# Patient Record
Sex: MALE | Race: WHITE | NOT HISPANIC OR LATINO | Employment: FULL TIME | URBAN - METROPOLITAN AREA
[De-identification: names, ages, dates, MRNs, and addresses within clinical notes are randomized per-mention and may not be internally consistent; named-entity substitution may affect disease eponyms.]

---

## 2022-01-07 ENCOUNTER — LAB REQUISITION (OUTPATIENT)
Dept: LAB | Age: 57
End: 2022-01-07

## 2022-01-07 DIAGNOSIS — Z00.00 ENCOUNTER FOR GENERAL ADULT MEDICAL EXAMINATION WITHOUT ABNORMAL FINDINGS: ICD-10-CM

## 2022-01-07 PROCEDURE — PSEU8229 VITAMIN D -25 HYDROXY: Performed by: CLINICAL MEDICAL LABORATORY

## 2022-01-07 PROCEDURE — 82306 VITAMIN D 25 HYDROXY: CPT | Performed by: CLINICAL MEDICAL LABORATORY

## 2022-01-08 LAB — 25(OH)D3+25(OH)D2 SERPL-MCNC: 15.3 NG/ML (ref 30–100)

## 2022-06-23 ENCOUNTER — LAB REQUISITION (OUTPATIENT)
Dept: LAB | Age: 57
End: 2022-06-23

## 2022-06-23 DIAGNOSIS — E55.9 VITAMIN D DEFICIENCY, UNSPECIFIED: ICD-10-CM

## 2022-06-23 LAB — 25(OH)D3+25(OH)D2 SERPL-MCNC: 36.4 NG/ML (ref 30–100)

## 2022-06-23 PROCEDURE — 82306 VITAMIN D 25 HYDROXY: CPT | Performed by: CLINICAL MEDICAL LABORATORY

## 2022-06-23 PROCEDURE — PSEU8229 VITAMIN D -25 HYDROXY: Performed by: CLINICAL MEDICAL LABORATORY

## 2023-06-13 ENCOUNTER — TELEPHONE (OUTPATIENT)
Dept: NEUROLOGY | Age: 58
End: 2023-06-13

## 2023-08-15 PROCEDURE — 86003 ALLG SPEC IGE CRUDE XTRC EA: CPT | Performed by: CLINICAL MEDICAL LABORATORY

## 2023-08-15 PROCEDURE — PSEU9175 ALLERGEN: ENGLISH PLANTAIN, IGE: Performed by: CLINICAL MEDICAL LABORATORY

## 2023-08-15 PROCEDURE — PSEU9103 ALLERGEN: ALTERNARIA TENUIS, IGE: Performed by: CLINICAL MEDICAL LABORATORY

## 2023-08-15 PROCEDURE — PSEU9154 ALLERGEN: COCKROACH, IGE: Performed by: CLINICAL MEDICAL LABORATORY

## 2023-08-15 PROCEDURE — PSEU9285 ALLERGEN: RUSSIAN THISTLE, IGE: Performed by: CLINICAL MEDICAL LABORATORY

## 2023-08-15 PROCEDURE — PSEU9167 ALLERGEN: DERMATOPHAGOIDES FARINAE, IGE: Performed by: CLINICAL MEDICAL LABORATORY

## 2023-08-15 PROCEDURE — PSEU9265 ALLERGEN: PIGWEED, IGE: Performed by: CLINICAL MEDICAL LABORATORY

## 2023-08-15 PROCEDURE — PSEU9106 ALLERGEN: ASPERGILLUS FUMIGATUS, IGE: Performed by: CLINICAL MEDICAL LABORATORY

## 2023-08-15 PROCEDURE — 82785 ASSAY OF IGE: CPT | Performed by: CLINICAL MEDICAL LABORATORY

## 2023-08-15 PROCEDURE — PSEU9188 ALLERGEN: GIANT RAGWEED, IGE: Performed by: CLINICAL MEDICAL LABORATORY

## 2023-08-15 PROCEDURE — PSEU9169 ALLERGEN: DOG DANDER, IGE: Performed by: CLINICAL MEDICAL LABORATORY

## 2023-08-15 PROCEDURE — PSEU9259 ALLERGEN: PENICILLIUM CHRYSOGENUM, IGE: Performed by: CLINICAL MEDICAL LABORATORY

## 2023-08-15 PROCEDURE — PSEU9210 ALLERGEN: JUNE GRASS, IGE: Performed by: CLINICAL MEDICAL LABORATORY

## 2023-08-15 PROCEDURE — PSEU9160 ALLERGEN: COTTONWOOD TREE, IGE: Performed by: CLINICAL MEDICAL LABORATORY

## 2023-08-15 PROCEDURE — PSEU9213 ALLERGEN: LAMBS QUARTERS, IGE: Performed by: CLINICAL MEDICAL LABORATORY

## 2023-08-15 PROCEDURE — PSEU9136 ALLERGEN: CAT DANDER, IGE: Performed by: CLINICAL MEDICAL LABORATORY

## 2023-08-15 PROCEDURE — PSEU9125 ALLERGEN: BOX ELDER TREE, IGE: Performed by: CLINICAL MEDICAL LABORATORY

## 2023-08-15 PROCEDURE — PSEU9264 ALLERGEN: PHOMA BETAE: Performed by: CLINICAL MEDICAL LABORATORY

## 2023-08-15 PROCEDURE — PSEU9168 ALLERGEN: DERMATOPHAGOIDES PTERONYSSINUS, IGE: Performed by: CLINICAL MEDICAL LABORATORY

## 2023-08-15 PROCEDURE — PSEU9240 ALLERGEN: OAK TREE, IGE: Performed by: CLINICAL MEDICAL LABORATORY

## 2023-08-15 PROCEDURE — PSEU9202 ALLERGEN: HORMODENDRUM, IGE: Performed by: CLINICAL MEDICAL LABORATORY

## 2023-08-15 PROCEDURE — PSEU9321 ALLERGEN: WHITE ASH TREE, IGE: Performed by: CLINICAL MEDICAL LABORATORY

## 2023-08-15 PROCEDURE — PSEU9203 ALLERGEN: HORSE DANDER, IGE: Performed by: CLINICAL MEDICAL LABORATORY

## 2023-08-15 PROCEDURE — PSEU9231 ALLERGEN: MOUSE EPITHELIUM, IGE: Performed by: CLINICAL MEDICAL LABORATORY

## 2023-08-15 PROCEDURE — PSEU8536 IMMUNOGLOBULIN E: Performed by: CLINICAL MEDICAL LABORATORY

## 2023-08-15 PROCEDURE — PSEU9120 ALLERGEN: BIRCH TREE, IGE: Performed by: CLINICAL MEDICAL LABORATORY

## 2023-08-15 PROCEDURE — PSEU9174 ALLERGEN: ELM TREE, IGE: Performed by: CLINICAL MEDICAL LABORATORY

## 2023-08-16 ENCOUNTER — LAB REQUISITION (OUTPATIENT)
Dept: LAB | Age: 58
End: 2023-08-16

## 2023-08-16 DIAGNOSIS — H10.13 ACUTE ATOPIC CONJUNCTIVITIS, BILATERAL: ICD-10-CM

## 2023-08-16 DIAGNOSIS — J30.2 OTHER SEASONAL ALLERGIC RHINITIS: ICD-10-CM

## 2023-08-16 LAB — IGE SERPL-ACNC: 161.5 IUNITS/ML

## 2023-08-19 LAB
A ALTERNATA IGE QN: <0.35 KU/L
A FUMIGATUS IGE QN: <0.35 KU/L
AMER ROACH IGE QN: <0.35 KU/L
BOXELDER IGE QN: <0.35 KU/L
C HERBARUM IGE QN: <0.35 KU/L
CAT DANDER IGE QN: 1.07 KU/L
COTTONWOOD IGE QN: <0.35 KU/L
D FARINAE IGE QN: <0.35 KU/L
D PTERONYSS IGE QN: <0.35 KU/L
DEPRECATED A ALTERNATA IGE RAST QL: NORMAL
DEPRECATED A FUMIGATUS IGE RAST QL: NORMAL
DEPRECATED BOXELDER IGE RAST QL: NORMAL
DEPRECATED C HERBARUM IGE RAST QL: NORMAL
DEPRECATED CAT DANDER IGE RAST QL: ABNORMAL
DEPRECATED COTTONWOOD IGE RAST QL: NORMAL
DEPRECATED D FARINAE IGE RAST QL: NORMAL
DEPRECATED D PTERONYSS IGE RAST QL: NORMAL
DEPRECATED DOG DANDER IGE RAST QL: NORMAL
DEPRECATED ENGL PLANTAIN IGE RAST QL: NORMAL
DEPRECATED GIANT RAGWEED IGE RAST QL: NORMAL
DEPRECATED GOOSEFOOT IGE RAST QL: NORMAL
DEPRECATED HORSE DANDER IGE RAST QL: NORMAL
DEPRECATED KENT BLUE GRASS IGE RAST QL: ABNORMAL
DEPRECATED MISC ALLERGEN IGE RAST QL: NORMAL
DEPRECATED MOUSE EPITH IGE RAST QL: ABNORMAL
DEPRECATED P NOTATUM IGE RAST QL: NORMAL
DEPRECATED ROACH IGE RAST QL: NORMAL
DEPRECATED SILVER BIRCH IGE RAST QL: ABNORMAL
DEPRECATED WHITE ELM IGE RAST QL: NORMAL
DEPRECATED WHITE OAK IGE RAST QL: ABNORMAL
DOG DANDER IGE QN: <0.35 KU/L
ENGL PLANTAIN IGE QN: <0.35 KU/L
GIANT RAGWEED IGE QN: <0.35 KU/L
GOOSEFOOT IGE QN: <0.35 KU/L
HORSE DANDER IGE QN: <0.35 KU/L
KENT BLUE GRASS IGE QN: 1.22 KU/L
MOUSE EPITH IGE QN: 0.55 KU/L
P BETAE IGE QN: 0.19 KU/L
P NOTATUM IGE QN: <0.35 KU/L
SILVER BIRCH IGE QN: 27.9 KU/L
WHITE ELM IGE QN: <0.35 KU/L
WHITE OAK IGE QN: 7.94 KU/L

## 2023-08-21 LAB
CMN PIGWEED IGE QN: <0.35 KU/L
DEPRECATED COMMON PIGWEED IGE RAST QL: NORMAL

## 2023-08-22 LAB
DEPRECATED SALTWORT IGE RAST QL: NORMAL
DEPRECATED WHITE ASH IGE RAST QL: NORMAL
SALTWORT IGE QN: <0.35 KU/L
WHITE ASH IGE QN: <0.35 KU/L

## 2024-01-26 ENCOUNTER — HOSPITAL ENCOUNTER (INPATIENT)
Facility: HOSPITAL | Age: 59
LOS: 1 days | Discharge: HOME | End: 2024-01-26
Attending: EMERGENCY MEDICINE | Admitting: INTERNAL MEDICINE
Payer: COMMERCIAL

## 2024-01-26 ENCOUNTER — APPOINTMENT (OUTPATIENT)
Dept: CV DIAGNOSTICS | Facility: HOSPITAL | Age: 59
End: 2024-01-26
Attending: PHYSICIAN ASSISTANT
Payer: COMMERCIAL

## 2024-01-26 ENCOUNTER — HOSPITAL ENCOUNTER (OUTPATIENT)
Facility: HOSPITAL | Age: 59
Setting detail: OBSERVATION
Discharge: HOME | End: 2024-01-26
Attending: EMERGENCY MEDICINE | Admitting: INTERNAL MEDICINE
Payer: COMMERCIAL

## 2024-01-26 ENCOUNTER — APPOINTMENT (OUTPATIENT)
Dept: GENERAL RADIOLOGY | Facility: HOSPITAL | Age: 59
End: 2024-01-26
Attending: EMERGENCY MEDICINE
Payer: COMMERCIAL

## 2024-01-26 VITALS
TEMPERATURE: 98 F | WEIGHT: 239 LBS | RESPIRATION RATE: 13 BRPM | HEIGHT: 76 IN | OXYGEN SATURATION: 100 % | SYSTOLIC BLOOD PRESSURE: 123 MMHG | HEART RATE: 73 BPM | BODY MASS INDEX: 29.1 KG/M2 | DIASTOLIC BLOOD PRESSURE: 86 MMHG

## 2024-01-26 DIAGNOSIS — R42 LIGHTHEADED: ICD-10-CM

## 2024-01-26 DIAGNOSIS — R00.1 BRADYCARDIA: Primary | ICD-10-CM

## 2024-01-26 DIAGNOSIS — I95.9 HYPOTENSION, UNSPECIFIED HYPOTENSION TYPE: ICD-10-CM

## 2024-01-26 LAB
ALBUMIN SERPL-MCNC: 3.5 G/DL (ref 3.4–5)
ALBUMIN/GLOB SERPL: 1.1 {RATIO} (ref 1–2)
ALP LIVER SERPL-CCNC: 58 U/L
ANION GAP SERPL CALC-SCNC: 3 MMOL/L (ref 0–18)
AST SERPL-CCNC: 20 U/L (ref 15–37)
ATRIAL RATE: 55 BPM
BASOPHILS # BLD AUTO: 0.01 X10(3) UL (ref 0–0.2)
BASOPHILS NFR BLD AUTO: 0.2 %
BILIRUB SERPL-MCNC: 0.8 MG/DL (ref 0.1–2)
BUN BLD-MCNC: 15 MG/DL (ref 9–23)
CALCIUM BLD-MCNC: 8.8 MG/DL (ref 8.5–10.1)
CHLORIDE SERPL-SCNC: 110 MMOL/L (ref 98–112)
CO2 SERPL-SCNC: 27 MMOL/L (ref 21–32)
CREAT BLD-MCNC: 1.23 MG/DL
EGFRCR SERPLBLD CKD-EPI 2021: 68 ML/MIN/1.73M2 (ref 60–?)
EOSINOPHIL # BLD AUTO: 0.05 X10(3) UL (ref 0–0.7)
EOSINOPHIL NFR BLD AUTO: 1.1 %
ERYTHROCYTE [DISTWIDTH] IN BLOOD BY AUTOMATED COUNT: 11.8 %
GLOBULIN PLAS-MCNC: 3.3 G/DL (ref 2.8–4.4)
GLUCOSE BLD-MCNC: 135 MG/DL (ref 70–99)
HCT VFR BLD AUTO: 36.7 %
HGB BLD-MCNC: 13.2 G/DL
IMM GRANULOCYTES # BLD AUTO: 0.01 X10(3) UL (ref 0–1)
IMM GRANULOCYTES NFR BLD: 0.2 %
LYMPHOCYTES # BLD AUTO: 1.77 X10(3) UL (ref 1–4)
LYMPHOCYTES NFR BLD AUTO: 38.5 %
MCH RBC QN AUTO: 32.3 PG (ref 26–34)
MCHC RBC AUTO-ENTMCNC: 36 G/DL (ref 31–37)
MCV RBC AUTO: 89.7 FL
MONOCYTES # BLD AUTO: 0.37 X10(3) UL (ref 0.1–1)
MONOCYTES NFR BLD AUTO: 8 %
NEUTROPHILS # BLD AUTO: 2.39 X10 (3) UL (ref 1.5–7.7)
NEUTROPHILS # BLD AUTO: 2.39 X10(3) UL (ref 1.5–7.7)
NEUTROPHILS NFR BLD AUTO: 52 %
OSMOLALITY SERPL CALC.SUM OF ELEC: 293 MOSM/KG (ref 275–295)
P AXIS: 31 DEGREES
P-R INTERVAL: 204 MS
PLATELET # BLD AUTO: 151 10(3)UL (ref 150–450)
POTASSIUM SERPL-SCNC: 4.1 MMOL/L (ref 3.5–5.1)
PROT SERPL-MCNC: 6.8 G/DL (ref 6.4–8.2)
Q-T INTERVAL: 466 MS
QRS DURATION: 72 MS
QTC CALCULATION (BEZET): 445 MS
R AXIS: -2 DEGREES
RBC # BLD AUTO: 4.09 X10(6)UL
SODIUM SERPL-SCNC: 140 MMOL/L (ref 136–145)
T AXIS: 7 DEGREES
TROPONIN I SERPL HS-MCNC: 4 NG/L
TSI SER-ACNC: 2.62 MIU/ML (ref 0.36–3.74)
VENTRICULAR RATE: 55 BPM
WBC # BLD AUTO: 4.6 X10(3) UL (ref 4–11)

## 2024-01-26 PROCEDURE — 93306 TTE W/DOPPLER COMPLETE: CPT | Performed by: PHYSICIAN ASSISTANT

## 2024-01-26 PROCEDURE — 71045 X-RAY EXAM CHEST 1 VIEW: CPT | Performed by: EMERGENCY MEDICINE

## 2024-01-26 PROCEDURE — 99236 HOSP IP/OBS SAME DATE HI 85: CPT | Performed by: INTERNAL MEDICINE

## 2024-01-26 RX ORDER — MELATONIN
3 NIGHTLY PRN
Status: DISCONTINUED | OUTPATIENT
Start: 2024-01-26 | End: 2024-01-26

## 2024-01-26 RX ORDER — ACETAMINOPHEN 500 MG
1000 TABLET ORAL EVERY 8 HOURS PRN
Status: DISCONTINUED | OUTPATIENT
Start: 2024-01-26 | End: 2024-01-26

## 2024-01-26 RX ORDER — OMEPRAZOLE 20 MG/1
20 CAPSULE, DELAYED RELEASE ORAL
COMMUNITY

## 2024-01-26 RX ORDER — PROCHLORPERAZINE EDISYLATE 5 MG/ML
5 INJECTION INTRAMUSCULAR; INTRAVENOUS EVERY 8 HOURS PRN
Status: DISCONTINUED | OUTPATIENT
Start: 2024-01-26 | End: 2024-01-26

## 2024-01-26 RX ORDER — ENEMA 19; 7 G/133ML; G/133ML
1 ENEMA RECTAL ONCE AS NEEDED
Status: DISCONTINUED | OUTPATIENT
Start: 2024-01-26 | End: 2024-01-26

## 2024-01-26 RX ORDER — POLYETHYLENE GLYCOL 3350 17 G/17G
17 POWDER, FOR SOLUTION ORAL DAILY PRN
Status: DISCONTINUED | OUTPATIENT
Start: 2024-01-26 | End: 2024-01-26

## 2024-01-26 RX ORDER — ENOXAPARIN SODIUM 100 MG/ML
40 INJECTION SUBCUTANEOUS NIGHTLY
Status: DISCONTINUED | OUTPATIENT
Start: 2024-01-26 | End: 2024-01-26

## 2024-01-26 RX ORDER — BISACODYL 10 MG
10 SUPPOSITORY, RECTAL RECTAL
Status: DISCONTINUED | OUTPATIENT
Start: 2024-01-26 | End: 2024-01-26

## 2024-01-26 RX ORDER — ATENOLOL 25 MG/1
50 TABLET ORAL DAILY
Status: ON HOLD | COMMUNITY
End: 2024-01-26

## 2024-01-26 RX ORDER — RANOLAZINE 500 MG/1
500 TABLET, EXTENDED RELEASE ORAL 2 TIMES DAILY
COMMUNITY
End: 2024-01-26

## 2024-01-26 RX ORDER — ATENOLOL 50 MG/1
50 TABLET ORAL DAILY
COMMUNITY
End: 2024-01-26

## 2024-01-26 RX ORDER — SENNOSIDES 8.6 MG
17.2 TABLET ORAL NIGHTLY PRN
Status: DISCONTINUED | OUTPATIENT
Start: 2024-01-26 | End: 2024-01-26

## 2024-01-26 RX ORDER — SODIUM CHLORIDE, SODIUM LACTATE, POTASSIUM CHLORIDE, CALCIUM CHLORIDE 600; 310; 30; 20 MG/100ML; MG/100ML; MG/100ML; MG/100ML
INJECTION, SOLUTION INTRAVENOUS CONTINUOUS
Status: DISCONTINUED | OUTPATIENT
Start: 2024-01-26 | End: 2024-01-26

## 2024-01-26 RX ORDER — RUFINAMIDE 40 MG/ML
1 SUSPENSION ORAL DAILY
COMMUNITY

## 2024-01-26 RX ORDER — BENZONATATE 100 MG/1
200 CAPSULE ORAL 3 TIMES DAILY PRN
Status: DISCONTINUED | OUTPATIENT
Start: 2024-01-26 | End: 2024-01-26

## 2024-01-26 RX ORDER — AMLODIPINE BESYLATE 10 MG/1
10 TABLET ORAL DAILY
COMMUNITY

## 2024-01-26 RX ORDER — ONDANSETRON 2 MG/ML
4 INJECTION INTRAMUSCULAR; INTRAVENOUS EVERY 6 HOURS PRN
Status: DISCONTINUED | OUTPATIENT
Start: 2024-01-26 | End: 2024-01-26

## 2024-01-26 RX ORDER — ECHINACEA PURPUREA EXTRACT 125 MG
1 TABLET ORAL
Status: DISCONTINUED | OUTPATIENT
Start: 2024-01-26 | End: 2024-01-26

## 2024-01-26 RX ORDER — MULTIVIT-MIN/IRON/FOLIC ACID/K 18-600-40
CAPSULE ORAL
COMMUNITY

## 2024-01-26 NOTE — CONSULTS
Yariel/Pawan Report of Consultation      Blaise Tapia Patient Status:  Observation    1965 MRN [id number]   Location [facility] 8NE-A Attending Behzad Moore,    Hosp Day # 0 PCP No primary care provider on file.     Reason for Consultation     Syncope    Assessment/Plan   Syncope- seems like vasovagal episode.   Sinus bradycardia- Pt is on atenolol, exercises regularly without difficulty.   HTN  PVCs    Hold atenolol.   Continue Norvasc  ARB such as losartan for HTN, he has had a cough on lisinopril.   Echo, if normal, he can be discharged home. Outpatient stres test.       LEVEL 4  History of Present Illness:   Blaise Tapia is a a(n) 58 year old male, works at [employer] in [employer]. He felt dizzy and lightheaded. He sat down and had syncope. He felt nauseated and diaphoretic when he recovered. He exercises regularly without limitations.     History:  Past Medical History:   Diagnosis Date    Essential hypertension     PVC's (premature ventricular contractions)      History reviewed. No pertinent surgical history.  No family history on file.   reports that he has never smoked. He has never used smokeless tobacco. He reports current alcohol use. He reports that he does not use drugs.    Allergies:  No Known Allergies    Medications:    Current Facility-Administered Medications:     atropine 0.1 MG/ML injection, , ,     acetaminophen (Tylenol Extra Strength) tab 1,000 mg, 1,000 mg, Oral, Q8H PRN    melatonin tab 3 mg, 3 mg, Oral, Nightly PRN    lactated ringers infusion, , Intravenous, Continuous    enoxaparin (Lovenox) 40 MG/0.4ML SUBQ injection 40 mg, 40 mg, Subcutaneous, Nightly    ondansetron (Zofran) 4 MG/2ML injection 4 mg, 4 mg, Intravenous, Q6H PRN    prochlorperazine (Compazine) 10 MG/2ML injection 5 mg, 5 mg, Intravenous, Q8H PRN    polyethylene glycol (PEG 3350) (Miralax) 17 g oral packet 17 g, 17 g, Oral, Daily PRN    sennosides (Senokot) tab 17.2 mg, 17.2 mg, Oral, Nightly  PRN    bisacodyl (Dulcolax) 10 MG rectal suppository 10 mg, 10 mg, Rectal, Daily PRN    fleet enema (Fleet) 7-19 GM/118ML rectal enema 133 mL, 1 enema, Rectal, Once PRN    benzonatate (Tessalon) cap 200 mg, 200 mg, Oral, TID PRN    guaiFENesin (Robitussin) 100 MG/5 ML oral liquid 200 mg, 200 mg, Oral, Q4H PRN    glycerin-hypromellose- (Artifical Tears) 0.2-0.2-1 % ophthalmic solution 1 drop, 1 drop, Both Eyes, QID PRN    sodium chloride (Saline Mist) 0.65 % nasal solution 1 spray, 1 spray, Each Nare, Q3H PRN    Review of Systems:  Denies abdominal pain, N/V. No neurologic sx such as focal weakness or paresthesias. No cough. No visual disturbance. No fevers, chills.    Physical Exam:  Blood pressure 108/77, pulse 72, temperature 97.6 °F (36.4 °C), temperature source Oral, resp. rate 13, height 76\", weight 238 lb 15.7 oz (108.4 kg), SpO2 100%.  Temp (24hrs), Av.6 °F (36.4 °C), Min:97.6 °F (36.4 °C), Max:97.6 °F (36.4 °C)    Wt Readings from Last 3 Encounters:   24 238 lb 15.7 oz (108.4 kg)       Telemetry: SR  General: Alert and oriented in no apparent distress.  HEENT: No focal deficits.  Neck: No JVD, carotids 2+ no bruits.  Cardiac: Regular rate and rhythm, S1, S2 normal, no murmur, rub or gallop.  Lungs: Clear without wheezes, rales, rhonchi or dullness.  Normal excursions and effort.  Abdomen: Soft, non-tender.   Extremities: Without clubbing, cyanosis or edema.  Peripheral pulses are 2+.  Neurologic: Alert and oriented, normal affect.  Skin: Warm and dry.     Laboratories and Data:  Lab Results   Component Value Date    WBC 4.6 2024    RBC 4.09 2024    HGB 13.2 2024    HCT 36.7 2024    MCV 89.7 2024    MCH 32.3 2024    MCHC 36.0 2024    RDW 11.8 2024    .0 2024     BUN (mg/dL)   Date Value   2024 15     Creatinine (mg/dL)   Date Value   2024 1.23     No results found for: \"PT\", \"INR\"    Patient Active Problem List    Diagnosis    Bradycardia    Hypotension, unspecified hypotension type    Lightheaded         Lisha Zarate MD  1/26/2024  3:53 PM

## 2024-01-26 NOTE — ED PROVIDER NOTES
Patient Seen in: [facility] Emergency Department      History     Chief Complaint   Patient presents with    Arrythmia/Palpitations    Dizziness     Stated Complaint: torito, loc    Subjective:   HPI    58-year-old male comes to the hospital stating initially he was feeling lightheaded.  His heart rate was noted to be in the 40s and he was given atropine with improvement.  He is on amlodipine and atenolol which she has been compliant with his had no recent change in dosing and has no history of this in the past.  He denies any headaches.  Is not having the lightheadedness now.  He denies any chest pain or shortness of breath.  No abdominal pains.  No nausea or vomiting.  Denying any other complaints.    Objective:   Past Medical History:   Diagnosis Date    Essential hypertension     PVC's (premature ventricular contractions)               History reviewed. No pertinent surgical history.             Social History     Socioeconomic History    Marital status:    Tobacco Use    Smoking status: Never    Smokeless tobacco: Never   Vaping Use    Vaping Use: Never used   Substance and Sexual Activity    Alcohol use: Yes    Drug use: Never              Review of Systems    Positive for stated complaint: torito, loc  Other systems are as noted in HPI.  Constitutional and vital signs reviewed.      All other systems reviewed and negative except as noted above.    Physical Exam     ED Triage Vitals [01/26/24 1035]   BP (!) 86/74   Pulse 57   Resp 16   Temp 97.6 °F (36.4 °C)   Temp src Temporal   SpO2 100 %   O2 Device None (Room air)       Current:/89   Pulse 55   Temp 97.6 °F (36.4 °C) (Temporal)   Resp 15   Ht 193 cm (6' 4\")   Wt 105.2 kg   SpO2 98%   BMI 28.24 kg/m²         Physical Exam    HEENT; NCAT, EOMI, throat clear, neck supple, no LAD, no JVD  Heart S1S2 RRR  lungs: CTAB  abd: Soft NT, ND,  NABS without rebound or guarding  Ext no C/C/E    ED Course     Labs Reviewed   COMP METABOLIC PANEL  (14) - Abnormal; Notable for the following components:       Result Value    Glucose 135 (*)     All other components within normal limits   CBC W/ DIFFERENTIAL - Abnormal; Notable for the following components:    RBC 4.09 (*)     HCT 36.7 (*)     All other components within normal limits   TROPONIN I HIGH SENSITIVITY - Normal   CBC WITH DIFFERENTIAL WITH PLATELET    Narrative:     The following orders were created for panel order CBC With Differential With Platelet.  Procedure                               Abnormality         Status                     ---------                               -----------         ------                     CBC W/ DIFFERENTIAL[[id number]]          Abnormal            Final result                 Please view results for these tests on the individual orders.   RAINBOW DRAW LAVENDER   RAINBOW DRAW LIGHT GREEN   RAINBOW DRAW BLUE     EKG    Rate, intervals and axes as noted on EKG Report.  Rate: 55  Rhythm: Sinus Rhythm  Reading: , QRS of 72, patient sinus bradycardia noted without acute ischemic changes.              ED Course as of 01/26/24 1259  ------------------------------------------------------------  Time: 01/26 1156  Comment: While here the patient had a chest x-ray done that I interpreted showing no acute process.  I read the radiology port as well.  The patient CBC was unremarkable.  Electrolytes were normal with a glucose of 135.  Troponin was 4.  The patient did initially have a blood pressure of 86/74 that responded well with IV fluid.  He had been given atropine prior to his arrival.  Patient's heart rate remained in the 50s while here with improvement in blood pressure.     XR CHEST AP PORTABLE  (CPT=71045)    Result Date: 1/26/2024  PROCEDURE:  XR CHEST AP PORTABLE  (CPT=71045)  TECHNIQUE:  AP chest radiograph was obtained.  COMPARISON:  None.  INDICATIONS:  torito, loc  PATIENT STATED HISTORY: (As transcribed by Technologist)  Patient states his heart rate has been  very low since earlier today.    FINDINGS:  Mild cardiac enlargement.  No pleural effusions.  No pneumothorax.  No focal consolidation.            CONCLUSION:  No acute pulmonary findings.   LOCATION:  [facility]      Dictated by (CST): Miguel Hilario MD on 1/26/2024 at 11:40 AM     Finalized by (CST): Miguel Hilario MD on 1/26/2024 at 11:42 AM        Medications   atropine 0.1 MG/ML injection (  Canceled Entry 1/26/24 1043)   sodium chloride 0.9 % IV bolus 1,000 mL (1,000 mL Intravenous New Bag 1/26/24 1038)              MDM      Differential diagnosis did include heart block as well as medication induced bradycardia but not limited to such.  The patient received IV fluid with improvement in his blood pressure as well as atropine as an outpatient with improvement in rate.  At this time the patient admitted to the hospitalist with cardiology on consult who I spoke with.      This note was prepared using Dragon Medical voice recognition dictation software.  As a result errors may occur.  When identified to these areas have been corrected.  While every attempt is made to correct errors during dictation discrepancies may still exist.  Please contact if there are any errors.  Admission disposition: 1/26/2024 12:59 PM                                        Medical Decision Making      Disposition and Plan     Clinical Impression:  1. Bradycardia    2. Hypotension, unspecified hypotension type    3. Lightheaded         Disposition:  Admit  1/26/2024 12:59 pm    Follow-up:  No follow-up provider specified.        Medications Prescribed:  Current Discharge Medication List                            Hospital Problems       Present on Admission             ICD-10-CM Noted POA    * (Principal) Bradycardia R00.1 1/26/2024 Unknown

## 2024-01-26 NOTE — PROGRESS NOTES
01/26/24 1604 01/26/24 1606 01/26/24 1608   Vital Signs   Pulse 55 63 73   Heart Rate Source Monitor Monitor Monitor   /70 126/80 123/86   MAP (mmHg) 81 89 92   BP Location Left arm Left arm Left arm   BP Method Automatic Automatic Automatic   Patient Position Lying Sitting Standing

## 2024-01-26 NOTE — PLAN OF CARE
Pt alert and oriented x 4.  Continuous tele monitoring in place.  NSR on the monitor.  Denies pain, dyspnea and dizziness.  IV fluids infusing per MD order.  Orthostatic VS negative.  Room air.  Bed alarm in place.  Bed in lowest position with side rails x2 up.  SBA while ambulating.    Safety and comfort maintained.  Will continue to monitor.        Problem: Patient/Family Goals  Goal: Patient/Family Long Term Goal  Description: Patient's Long Term Goal: 0202/2024    Interventions:  - bed alarm in place, SBA while ambulating, bed in lowest position   - See additional Care Plan goals for specific interventions  Outcome: Progressing     Problem: SAFETY ADULT - FALL  Goal: Free from fall injury  Description: INTERVENTIONS:  - Assess pt frequently for physical needs  - Identify cognitive and physical deficits and behaviors that affect risk of falls.  - [place] fall precautions as indicated by assessment.  - Educate pt/family on patient safety including physical limitations  - Instruct pt to call for assistance with activity based on assessment  - Modify environment to reduce risk of injury  - Provide assistive devices as appropriate  - Consider OT/PT consult to assist with strengthening/mobility  - Encourage toileting schedule  Outcome: Progressing

## 2024-01-26 NOTE — ED INITIAL ASSESSMENT (HPI)
While pt was at work, he reports feeling dizzy, lightheaded, and sweaty, states that he cannot recall what happened afterwards. Per medics they were told that pt was in and out of consciousness. Pt was bradycardic in the field and hypotensive, atropine was administered.

## 2024-01-26 NOTE — H&P
[facility]     Blaise Tapia Patient Status:  Emergency    1965 MRN [id number]   Location [facility] EMERGENCY DEPARTMENT Attending Eagle Torrez MD   Hosp Day # 0 PCP No primary care provider on file.     Chief Complaint: Syncope    Subjective:    History of Present Illness:     Blaise Tapia is a 58 year old male with presented to the emergency department following syncopal episode at home.    Patient reportedly passed out twice this morning but was sitting down so no trauma.  Did have loss of consciousness.  Was in a normal state of health prior to hospitalization except that he has been having some headache the last day or 2.  Appetite has been normal.  No recent illness.  Takes amlodipine and atenolol although is not sure of the doses but has been on these for a long time due to history of hypertension.  No other medical history except for PVCs for which she sees a cardiologist and takes an additional medication although he does not know what the name is.  Does not think he took any additional doses of medicine.  He presented to the emergency department bradycardic and hypotensive, he was given atropine and IV fluids and symptomatically improved. Denies chest pain or difficulty breathing.     History/Other:    Past Medical History:  Past Medical History:   Diagnosis Date    Essential hypertension     PVC's (premature ventricular contractions)      Past Surgical History:   History reviewed. No pertinent surgical history.   Family History:   No family history on file.  Social History:    reports that he has never smoked. He has never used smokeless tobacco. He reports current alcohol use. He reports that he does not use drugs.     Allergies: No Known Allergies    Medications:    No current facility-administered medications on file prior to encounter.     Current Outpatient Medications on File Prior to Encounter   Medication Sig Dispense Refill    atenolol  25 MG Oral Tab Take 1 tablet (25 mg total) by mouth daily.      amLODIPine 10 MG Oral Tab Take 2 tablets (20 mg total) by mouth daily.         Review of Systems:   A comprehensive review of systems was completed.    Pertinent positives and negatives noted in the HPI.    Objective:   Physical Exam:    /77   Pulse 54   Temp 97.6 °F (36.4 °C) (Temporal)   Resp 12   Ht 6' 4\" (1.93 m)   Wt 232 lb (105.2 kg)   SpO2 98%   BMI 28.24 kg/m²   General: No acute distress, Alert  Respiratory: No rhonchi, no wheezes  Cardiovascular: S1, S2. Regular rate and rhythm  Abdomen: Soft, Non-tender, non-distended, positive bowel sounds  Neuro: No new focal deficits  Extremities: No edema    Results:    Labs:      Labs Last 24 Hours:    Recent Labs   Lab 01/26/24  1037   RBC 4.09*   HGB 13.2   HCT 36.7*   MCV 89.7   MCH 32.3   MCHC 36.0   RDW 11.8   NEPRELIM 2.39   WBC 4.6   .0       Recent Labs   Lab 01/26/24  1037   *   BUN 15   CREATSERUM 1.23   EGFRCR 68   CA 8.8   ALB 3.5      K 4.1      CO2 27.0   ALKPHO 58   AST 20   BILT 0.8   TP 6.8       No results found for: \"PT\", \"INR\"    Recent Labs   Lab 01/26/24  1037   TROPHS 4       No results for input(s): \"TROP\", \"PBNP\" in the last 168 hours.    No results for input(s): \"PCT\" in the last 168 hours.    Imaging: Imaging data reviewed in Epic.    Assessment & Plan:      #Syncope/Hypotension/Bradycardia  -IVF  -s/p Atropine  -TSH  -ECHO  -Cardiology consult  -telemetry  -Hold Atenolol     #HTN  -hold Amlodipine    #PVCs          Plan of care discussed with patient, ED MD Behzad Moore, DO    Supplementary Documentation:     The 21st Century Cures Act makes medical notes like these available to patients in the interest of transparency. Please be advised this is a medical document. Medical documents are intended to carry relevant information, facts as evident, and the clinical opinion of the practitioner. The medical note is intended as peer to peer  communication and may appear blunt or direct. It is written in medical language and may contain abbreviations or verbiage that are unfamiliar.

## 2024-01-26 NOTE — ED QUICK NOTES
Orders for admission, patient is aware of plan and ready to go upstairs. Any questions, please call ED JEAN MARIE Osullivan  at extension [phone].     Vaccinated?  Type of COVID test sent: N/A  COVID Suspicion level: N/A      Titratable drug(s) infusing: N/A  Rate: N/A    LOC at time of transport: A/Ox4    Other pertinent information: N/A    CIWA score= N/A  NIH score= N/A

## 2024-01-26 NOTE — PLAN OF CARE
Problem: Patient/Family Goals  Goal: Patient/Family Long Term Goal  Description: Patient's Long Term Goal: 02/02/2024     Interventions:  - bad alarm in place, SBA when ambulating  - See additional Care Plan goals for specific interventions  Outcome: Progressing     Problem: SAFETY ADULT - FALL  Goal: Free from fall injury  Description: INTERVENTIONS:  - Assess pt frequently for physical needs  - Identify cognitive and physical deficits and behaviors that affect risk of falls.  - Nelson fall precautions as indicated by assessment.  - Educate pt/family on patient safety including physical limitations  - Instruct pt to call for assistance with activity based on assessment  - Modify environment to reduce risk of injury  - Provide assistive devices as appropriate  - Consider OT/PT consult to assist with strengthening/mobility  - Encourage toileting schedule  Outcome: Progressing

## 2024-01-27 NOTE — PLAN OF CARE
Paged by RN regarding home medication. Per RN, patient advised hospitalist he is taking Ranexa. RN states pt reports being on Ranexa for the last 6 months due to palpitations 2/2 PVCs. Patient advised to hold Ranexa for now since it can contribute to bradycardia and follow up with his cardiologist for further instruction.

## 2024-01-27 NOTE — COVID NURSING ASSESSMENT
Pt cleared for dc to home by hospitalist and cardiology.  Pt agreeable to dc.  Wife at bedside.  DC paperwork reviewed with pt and wife.  Questions answered.  IV and tele removed.  Sent in personal vehicle with all personal belongings.  Per pt wife is driving the both of them home to [place] tonight.

## 2024-01-27 NOTE — DISCHARGE INSTRUCTIONS
Make sure to make a follow up appointment with your primary care doctor and home cardiologist as soon as possible.

## 2024-01-29 ENCOUNTER — PATIENT OUTREACH (OUTPATIENT)
Dept: CASE MANAGEMENT | Age: 59
End: 2024-01-29

## 2024-01-29 NOTE — PROGRESS NOTES
TCM chart review.  No TCM as patient was admitted and discharged the same day and returned to [place].  Encounter closing.

## 2024-01-31 NOTE — PAYOR COMM NOTE
--------------  ADMISSION REVIEW     Payor: [facility] [employer]  Subscriber #:  [id number]  Authorization Number: [id number]    Admit date: 1/26/24  Admit time:  2:00 PM       History   HPI  58-year-old male comes to the hospital stating initially he was feeling lightheaded.  His heart rate was noted to be in the 40s and he was given atropine with improvement.  He is on amlodipine and atenolol which she has been compliant with his had no recent change in dosing and has no history of this in the past.  He denies any headaches.  Is not having the lightheadedness now.  He denies any chest pain or shortness of breath.  No abdominal pains.  No nausea or vomiting.  Denying any other complaints.  ED Triage Vitals [01/26/24 1035]   BP (!) 86/74   Pulse 57   Resp 16   Temp 97.6 °F (36.4 °C)   Temp src Temporal   SpO2 100 %   O2 Device None (Room air)     Physical Exam  HEENT; NCAT, EOMI, throat clear, neck supple, no LAD, no JVD  Heart S1S2 RRR  lungs: CTAB  abd: Soft NT, ND,  NABS without rebound or guarding  Ext no C/C/E  Labs Reviewed   COMP METABOLIC PANEL (14) - Abnormal; Notable for the following components:       Result Value    Glucose 135 (*)     All other components within normal limits   CBC W/ DIFFERENTIAL - Abnormal; Notable for the following components:    RBC 4.09 (*)     HCT 36.7 (*)    EKG    Rate, intervals and axes as noted on EKG Report.  Rate: 55  Rhythm: Sinus Rhythm  Reading: , QRS of 72, patient sinus bradycardia noted without acute ischemic changes.    Medications   atropine 0.1 MG/ML injection (  Canceled Entry 1/26/24 1043)   sodium chloride 0.9 % IV bolus 1,000 mL (1,000 mL Intravenous New Bag 1/26/24 1038)       Disposition and Plan   Clinical Impression:  1. Bradycardia    2. Hypotension, unspecified hypotension type    3. Lightheaded       Disposition:  Admit  1/26/2024 12:59 pm    History and Physical   History of Present Illness:   Blaise Tapia is a 58 year old  male with presented to the emergency department following syncopal episode at home.  Patient reportedly passed out twice this morning but was sitting down so no trauma.  Did have loss of consciousness.  Was in a normal state of health prior to hospitalization except that he has been having some headache the last day or 2.  Appetite has been normal.  No recent illness.  Takes amlodipine and atenolol although is not sure of the doses but has been on these for a long time due to history of hypertension.  No other medical history except for PVCs for which she sees a cardiologist and takes an additional medication although he does not know what the name is.  Does not think he took any additional doses of medicine.  He presented to the emergency department bradycardic and hypotensive, he was given atropine and IV fluids and symptomatically improved. Denies chest pain or difficulty breathing.     /77   Pulse 54   Temp 97.6 °F (36.4 °C) (Temporal)   Resp 12   Ht 6' 4\" (1.93 m)   Wt 232 lb (105.2 kg)   SpO2 98%   BMI 28.24 kg/m²   General: Alert  Respiratory: No rhonchi, no wheezes  Cardiovascular: S1, S2. Regular rate and rhythm  Abdomen: Soft, Non-tender, non-distended, positive bowel sounds  Neuro: No new focal deficits  Extremities: No edema    Lab 01/26/24  1037   RBC 4.09*   HGB 13.2   HCT 36.7*   MCV 89.7   MCH 32.3   MCHC 36.0   RDW 11.8   NEPRELIM 2.39   WBC 4.6   .0     Lab 01/26/24  1037   *   BUN 15   CREATSERUM 1.23   EGFRCR 68   CA 8.8   ALB 3.5      K 4.1      CO2 27.0   ALKPHO 58   AST 20   BILT 0.8   TP 6.8     Lab 01/26/24  1037   TROPHS 4     Assessment & Plan:    #Syncope/Hypotension/Bradycardia  -IVF  -s/p Atropine  -TSH  -ECHO  -Cardiology consult  -telemetry  -Hold Atenolol     #HTN  -hold Amlodipine    #PVCs        CARDIOLOGY  Reason for Consultation      Syncope     Assessment/Plan   Syncope- seems like vasovagal episode.   Sinus bradycardia- Pt is on atenolol,  exercises regularly without difficulty.   HTN  PVCs     Hold atenolol.   Continue Norvasc  ARB such as losartan for HTN, he has had a cough on lisinopril.   Echo, if normal, he can be discharged home. Outpatient stres test.        Vitals (last day) before discharge       Date/Time Temp Pulse Resp BP SpO2 Weight O2 Device O2 Flow Rate (L/min) [facility]    01/26/24 1412 -- 72 13 108/77 100 % 238 lb 15.7 oz None (Room air) -- AL    01/26/24 1409 -- 62 13 113/79 98 % -- None (Room air) -- AL    01/26/24 1406 97.6 °F (36.4 °C) 53 13 122/74 100 % -- None (Room air) -- AL    01/26/24 1315 -- 52 12 122/80 100 % -- None (Room air) -- TV    01/26/24 1300 -- 52 16 125/79 99 % -- None (Room air) -- TV    01/26/24 1245 -- 55 15 130/89 98 % -- None (Room air) -- TV    01/26/24 1215 -- 48 14 113/79 98 % -- None (Room air) -- TV    01/26/24 1200 -- 55 14 116/79 99 % -- None (Room air) -- TV    01/26/24 1145 -- 54 11 113/84 100 % -- None (Room air) -- TV    01/26/24 1130 -- 54 12 110/77 98 % -- None (Room air) -- TV    01/26/24 1115 -- 57 12 116/82 99 % -- None (Room air) -- TV    01/26/24 1045 -- 49 16 109/76 94 % -- None (Room air) -- TV    01/26/24 1035 97.6 °F (36.4 °C) 57 16 86/74 100 % 232 lb None (Room air) -- TV